# Patient Record
Sex: MALE | URBAN - METROPOLITAN AREA
[De-identification: names, ages, dates, MRNs, and addresses within clinical notes are randomized per-mention and may not be internally consistent; named-entity substitution may affect disease eponyms.]

---

## 2017-06-15 ENCOUNTER — APPOINTMENT (RX ONLY)
Age: 27
Setting detail: DERMATOLOGY
End: 2017-06-15

## 2017-06-15 DIAGNOSIS — L40.8 OTHER PSORIASIS: ICD-10-CM

## 2017-06-15 PROCEDURE — ? OTHER

## 2017-06-15 PROCEDURE — ? DIAGNOSIS COMMENT

## 2017-06-15 PROCEDURE — 99203 OFFICE O/P NEW LOW 30 MIN: CPT

## 2017-06-15 PROCEDURE — ? PRESCRIPTION

## 2017-06-15 RX ORDER — TRIAMCINOLONE ACETONIDE 0.25 MG/G
OINTMENT TOPICAL
Qty: 1 | Refills: 1 | Status: ERX | COMMUNITY
Start: 2017-06-15

## 2017-06-15 RX ADMIN — TRIAMCINOLONE ACETONIDE: 0.25 OINTMENT TOPICAL at 00:00

## 2017-06-15 ASSESSMENT — LOCATION DETAILED DESCRIPTION DERM
LOCATION DETAILED: RIGHT SCROTUM
LOCATION DETAILED: RIGHT AXILLARY VAULT
LOCATION DETAILED: RIGHT AXILLARY VAULT
LOCATION DETAILED: RIGHT SCROTUM
LOCATION DETAILED: VENTRAL GLANS PENIS

## 2017-06-15 ASSESSMENT — LOCATION SIMPLE DESCRIPTION DERM
LOCATION SIMPLE: RIGHT AXILLARY VAULT
LOCATION SIMPLE: PENIS
LOCATION SIMPLE: SCROTUM
LOCATION SIMPLE: SCROTUM
LOCATION SIMPLE: RIGHT AXILLARY VAULT

## 2017-06-15 ASSESSMENT — LOCATION ZONE DERM
LOCATION ZONE: AXILLAE
LOCATION ZONE: GENITALIA
LOCATION ZONE: PENIS
LOCATION ZONE: GENITALIA
LOCATION ZONE: AXILLAE

## 2017-06-15 NOTE — PROCEDURE: DIAGNOSIS COMMENT
Comment: The differential diagnosis includes psoriasis vs a psoriasiform dermatitis vs a contact dermatitis. Will have pt use topical steroid with no anti-fungal cream mixed. He will continue to avoid deoderant and try changing condom brands (no latex.) KOH prep from genital area and from right axilla were negative. He does have some mild nail pitting and onycholysis, which can be seen in psoriasis. He should f/u in 6 weeks, sooner if needed.
Detail Level: Zone

## 2017-06-15 NOTE — HPI: RASH
How Severe Is Your Rash?: mild
Is This A New Presentation, Or A Follow-Up?: Rash
Additional History: Rash was diagnosed as Tinea Versicolor by previous dermatologist.  He is not currently using any treatment for the rash.

## 2017-08-11 ENCOUNTER — APPOINTMENT (RX ONLY)
Age: 27
Setting detail: DERMATOLOGY
End: 2017-08-11

## 2017-08-11 DIAGNOSIS — L40.8 OTHER PSORIASIS: ICD-10-CM

## 2017-08-11 PROCEDURE — ? RECOMMENDATIONS

## 2017-08-11 PROCEDURE — 99212 OFFICE O/P EST SF 10 MIN: CPT

## 2017-08-11 PROCEDURE — ? DIAGNOSIS COMMENT

## 2017-08-11 ASSESSMENT — LOCATION ZONE DERM: LOCATION ZONE: GENITALIA

## 2017-08-11 ASSESSMENT — LOCATION DETAILED DESCRIPTION DERM: LOCATION DETAILED: LEFT SCROTUM

## 2017-08-11 ASSESSMENT — LOCATION SIMPLE DESCRIPTION DERM: LOCATION SIMPLE: SCROTUM

## 2017-08-11 NOTE — HPI: FREE FORM (FOLLOW UP HISTORY)
How Severe Is Your Skin Condition?: moderate
What Brings You In Today For Follow Up? (This Is An Xx Year Old Patient Who Is Following Up For:): Psoriasiform dermatitis
Where On Your Body Is It? (Located On:): Penis, scrotum and right axilla
What Was It Treated With? (The Condition Was Treated With:): Fluconazole, Terbinafine, anti-fungal mix cream, elidel, topicort and triamcinolone .
Since The Treatment Is Your Condition Better, Worse, Or Unchanged? (Since The Treatment, The Condition Is:): Pt states his condition is unchanged.\\nHe presents today for a follow up evaluation.

## 2017-08-11 NOTE — PROCEDURE: RECOMMENDATIONS
Recommendations (Free Text): - Triamcinolone ointment for 1-2 weeks on penis and scrotum, then switch to Elidel once daily or every other day\\n- f/u 6-8 weeks or as needed
Detail Level: Zone

## 2017-08-11 NOTE — PROCEDURE: DIAGNOSIS COMMENT
Detail Level: Simple
Comment: The differential dx includes psoriasis vs psoriasiform dermatitis vs contact dermatitis. He has the rash on the scrotum and head of penis. He is not using any products in the groin. He will avoid latex condoms. Because this may be a chronic problem, will have him use Elidel as maintenance.

## 2017-11-09 ENCOUNTER — APPOINTMENT (RX ONLY)
Age: 27
Setting detail: DERMATOLOGY
End: 2017-11-09

## 2017-11-09 DIAGNOSIS — L40.0 PSORIASIS VULGARIS: ICD-10-CM

## 2017-11-09 PROCEDURE — 99212 OFFICE O/P EST SF 10 MIN: CPT

## 2017-11-09 PROCEDURE — ? DIAGNOSIS COMMENT

## 2017-11-09 PROCEDURE — ? RECOMMENDATIONS

## 2017-11-09 ASSESSMENT — LOCATION DETAILED DESCRIPTION DERM: LOCATION DETAILED: RIGHT SUPERIOR CENTRAL MALAR CHEEK

## 2017-11-09 ASSESSMENT — LOCATION SIMPLE DESCRIPTION DERM: LOCATION SIMPLE: RIGHT CHEEK

## 2017-11-09 ASSESSMENT — LOCATION ZONE DERM: LOCATION ZONE: FACE

## 2017-11-09 NOTE — HPI: FREE FORM (INITIAL HISTORY)
How Severe Is Your Skin Condition? (The Patient Describes The Severity Level As....): mild
What Brings You In Today? (This Is An Xx Year Old Patient Who Presents For...): Rash
When Did You First Notice It? (The Patient First Noticed It...): today
Where On Your Body Is It? (Located On...): right lower eyelid
Any Associated Symptoms? (The Symptoms Include.....): inflamed and crusty
What Previous Treatments Have You Tried? (The Patient Has Tried The Following Treatments...): no treatments

## 2017-11-09 NOTE — PROCEDURE: DIAGNOSIS COMMENT
Detail Level: Simple
Comment: He has an erythematous well-defined, scaly plaque on the right lower eyelid. There are a few small plaques on the shaft of the penis.\\nThe plaque on the eyelid is most consistent with psoriasis.

## 2017-11-09 NOTE — PROCEDURE: RECOMMENDATIONS
Recommendations (Free Text): - For eyelid plaque: 1% HC ointment (OTC) twice daily for one week, then switch to Elidel \\n- For penis: continue use of triamcinolone 0.025% ointment for a few days as needed, then switch to Elidel\\n- Notify clinic if rashes do not improve or if they flare
Detail Level: Zone

## 2023-11-08 ENCOUNTER — APPOINTMENT (RX ONLY)
Age: 33
Setting detail: DERMATOLOGY
End: 2023-11-08

## 2023-11-08 DIAGNOSIS — L40.8 OTHER PSORIASIS: ICD-10-CM

## 2023-11-08 PROCEDURE — ? PRESCRIPTION

## 2023-11-08 PROCEDURE — ? DIAGNOSIS COMMENT

## 2023-11-08 PROCEDURE — ? RECOMMENDATIONS

## 2023-11-08 PROCEDURE — ? KOH PREP

## 2023-11-08 PROCEDURE — 87220 TISSUE EXAM FOR FUNGI: CPT

## 2023-11-08 PROCEDURE — 99203 OFFICE O/P NEW LOW 30 MIN: CPT

## 2023-11-08 RX ORDER — DESONIDE 0.5 MG/G
CREAM TOPICAL BID
Qty: 60 | Refills: 3 | Status: ERX | COMMUNITY
Start: 2023-11-08

## 2023-11-08 RX ORDER — CLOBETASOL PROPIONATE 0.5 MG/G
CREAM TOPICAL BID
Qty: 15 | Refills: 0 | Status: ERX | COMMUNITY
Start: 2023-11-08

## 2023-11-08 RX ADMIN — CLOBETASOL PROPIONATE: 0.5 CREAM TOPICAL at 00:00

## 2023-11-08 RX ADMIN — DESONIDE: 0.5 CREAM TOPICAL at 00:00

## 2023-11-08 ASSESSMENT — LOCATION SIMPLE DESCRIPTION DERM
LOCATION SIMPLE: LEFT PENIS
LOCATION SIMPLE: RIGHT AXILLARY VAULT
LOCATION SIMPLE: RIGHT SCROTUM
LOCATION SIMPLE: LEFT ANTERIOR AXILLA
LOCATION SIMPLE: LEFT AXILLARY VAULT

## 2023-11-08 ASSESSMENT — LOCATION ZONE DERM
LOCATION ZONE: SCROTUM
LOCATION ZONE: PENIS
LOCATION ZONE: AXILLAE

## 2023-11-08 ASSESSMENT — LOCATION DETAILED DESCRIPTION DERM
LOCATION DETAILED: RIGHT ANTERIOR INFERIOR SCROTUM
LOCATION DETAILED: LEFT LATERAL DISTAL DORSAL PENILE SHAFT
LOCATION DETAILED: RIGHT AXILLARY VAULT
LOCATION DETAILED: LEFT AXILLARY VAULT
LOCATION DETAILED: LEFT ANTERIOR AXILLA

## 2023-11-08 NOTE — PROCEDURE: DIAGNOSIS COMMENT
Detail Level: Simple
Render Risk Assessment In Note?: no
Comment: erythematous scaly plaques in axillae, on scrotum and on penis. Scrotal plaques show LSC.

## 2023-11-08 NOTE — PROCEDURE: KOH PREP
Cpt Desired: 01308
Koh Intro Text (From The.....): A KOH prep was ordered and evaluated from the
Koh Procedure Text (Tissue Harvesting Technique): A 15-blade scalpel was used to scrape the skin. The skin scrapings were placed on a glass slide, covered with a coverslip and a KOH solution was applied.
Showing: no pathologic findings
Body Location Override (Optional - Billing Will Still Be Based On Selected Body Map Location If Applicable): axilla
Detail Level: Simple

## 2024-09-23 ENCOUNTER — APPOINTMENT (RX ONLY)
Age: 34
Setting detail: DERMATOLOGY
End: 2024-09-23

## 2024-09-23 DIAGNOSIS — T07XXXA ABRASION OR FRICTION BURN OF OTHER, MULTIPLE, AND UNSPECIFIED SITES, WITHOUT MENTION OF INFECTION: ICD-10-CM

## 2024-09-23 DIAGNOSIS — L40.8 OTHER PSORIASIS: ICD-10-CM

## 2024-09-23 PROBLEM — S00.81XA ABRASION OF OTHER PART OF HEAD, INITIAL ENCOUNTER: Status: ACTIVE | Noted: 2024-09-23

## 2024-09-23 PROCEDURE — ? DIAGNOSIS COMMENT

## 2024-09-23 PROCEDURE — ? COUNSELING

## 2024-09-23 PROCEDURE — ? PATIENT SPECIFIC COUNSELING

## 2024-09-23 PROCEDURE — 99213 OFFICE O/P EST LOW 20 MIN: CPT

## 2024-09-23 ASSESSMENT — LOCATION SIMPLE DESCRIPTION DERM
LOCATION SIMPLE: LEFT CHEEK
LOCATION SIMPLE: DORSAL PENIS

## 2024-09-23 ASSESSMENT — LOCATION ZONE DERM
LOCATION ZONE: FACE
LOCATION ZONE: PENIS

## 2024-09-23 ASSESSMENT — LOCATION DETAILED DESCRIPTION DERM
LOCATION DETAILED: RIGHT LATERAL DISTAL DORSAL PENILE SHAFT
LOCATION DETAILED: LEFT LATERAL MALAR CHEEK

## 2024-09-23 NOTE — PROCEDURE: PATIENT SPECIFIC COUNSELING
asymptomatic and mild, so will hold off on treatment but he has desonide to use if needed
I recommended the patient apply Vaseline or Aquaphor and sunscreen/sun protection to the area. Discussed risk of PIH
Detail Level: Zone

## 2024-09-23 NOTE — PROCEDURE: DIAGNOSIS COMMENT
Detail Level: Simple
Render Risk Assessment In Note?: no
Comment: psoriasiform patches on penis
Detail Level: Zone
Comment: The abrasion is superficial, has crusted over. No evidence of infection. Pt is using aloe vera.